# Patient Record
Sex: MALE | Race: WHITE | Employment: OTHER | RURAL
[De-identification: names, ages, dates, MRNs, and addresses within clinical notes are randomized per-mention and may not be internally consistent; named-entity substitution may affect disease eponyms.]

---

## 2022-01-24 ENCOUNTER — PROCEDURE VISIT (OUTPATIENT)
Dept: DERMATOLOGY | Facility: CLINIC | Age: 77
End: 2022-01-24
Payer: OTHER GOVERNMENT

## 2022-01-24 VITALS
SYSTOLIC BLOOD PRESSURE: 126 MMHG | DIASTOLIC BLOOD PRESSURE: 76 MMHG | RESPIRATION RATE: 18 BRPM | HEIGHT: 71 IN | BODY MASS INDEX: 21.84 KG/M2 | WEIGHT: 156 LBS | HEART RATE: 77 BPM

## 2022-01-24 DIAGNOSIS — Z91.89 AT RISK FOR SURGICAL SITE INFECTION: ICD-10-CM

## 2022-01-24 DIAGNOSIS — C44.92 SCC (SQUAMOUS CELL CARCINOMA): Primary | ICD-10-CM

## 2022-01-24 DIAGNOSIS — D48.9 NEOPLASM OF UNCERTAIN BEHAVIOR: ICD-10-CM

## 2022-01-24 PROCEDURE — 17311 MOHS 1 STAGE H/N/HF/G: CPT | Mod: 51,,, | Performed by: STUDENT IN AN ORGANIZED HEALTH CARE EDUCATION/TRAINING PROGRAM

## 2022-01-24 PROCEDURE — 15240 PR FULL THICK GRFT HEAD,FAC,HAND <20SQC: ICD-10-PCS | Mod: ,,, | Performed by: STUDENT IN AN ORGANIZED HEALTH CARE EDUCATION/TRAINING PROGRAM

## 2022-01-24 PROCEDURE — 15240 FTH/GFT F/C/C/M/N/AX/G/H/F20: CPT | Mod: ,,, | Performed by: STUDENT IN AN ORGANIZED HEALTH CARE EDUCATION/TRAINING PROGRAM

## 2022-01-24 PROCEDURE — 11102 PR TANGENTIAL BIOPSY, SKIN, SINGLE LESION: ICD-10-PCS | Mod: XU,,, | Performed by: STUDENT IN AN ORGANIZED HEALTH CARE EDUCATION/TRAINING PROGRAM

## 2022-01-24 PROCEDURE — 99203 OFFICE O/P NEW LOW 30 MIN: CPT | Mod: 25,,, | Performed by: STUDENT IN AN ORGANIZED HEALTH CARE EDUCATION/TRAINING PROGRAM

## 2022-01-24 PROCEDURE — 88305 TISSUE EXAM BY PATHOLOGIST: CPT | Mod: 26,,, | Performed by: PATHOLOGY

## 2022-01-24 PROCEDURE — 99203 PR OFFICE/OUTPT VISIT, NEW, LEVL III, 30-44 MIN: ICD-10-PCS | Mod: 25,,, | Performed by: STUDENT IN AN ORGANIZED HEALTH CARE EDUCATION/TRAINING PROGRAM

## 2022-01-24 PROCEDURE — 11102 TANGNTL BX SKIN SINGLE LES: CPT | Mod: XU,,, | Performed by: STUDENT IN AN ORGANIZED HEALTH CARE EDUCATION/TRAINING PROGRAM

## 2022-01-24 PROCEDURE — 88305 PATHOLOGY, DERMATOLOGY: ICD-10-PCS | Mod: 26,,, | Performed by: PATHOLOGY

## 2022-01-24 PROCEDURE — 88305 TISSUE EXAM BY PATHOLOGIST: CPT | Mod: SUR | Performed by: STUDENT IN AN ORGANIZED HEALTH CARE EDUCATION/TRAINING PROGRAM

## 2022-01-24 PROCEDURE — 17311: ICD-10-PCS | Mod: 51,,, | Performed by: STUDENT IN AN ORGANIZED HEALTH CARE EDUCATION/TRAINING PROGRAM

## 2022-01-24 RX ORDER — CEPHALEXIN 500 MG/1
500 CAPSULE ORAL 3 TIMES DAILY
Qty: 21 CAPSULE | Refills: 0 | Status: SHIPPED | OUTPATIENT
Start: 2022-01-24 | End: 2022-01-31

## 2022-01-24 RX ORDER — ASPIRIN 81 MG/1
81 TABLET ORAL DAILY
COMMUNITY

## 2022-01-24 RX ORDER — HYDROCODONE BITARTRATE AND ACETAMINOPHEN 5; 325 MG/1; MG/1
1 TABLET ORAL EVERY 6 HOURS PRN
Qty: 8 TABLET | Refills: 0 | Status: SHIPPED | OUTPATIENT
Start: 2022-01-24

## 2022-01-24 RX ORDER — CLONAZEPAM 0.5 MG/1
TABLET ORAL
COMMUNITY
Start: 2022-01-06

## 2022-01-24 RX ORDER — CLONIDINE HYDROCHLORIDE 0.1 MG/1
0.2 TABLET ORAL
COMMUNITY

## 2022-01-24 RX ORDER — AMLODIPINE BESYLATE 10 MG/1
1 TABLET ORAL NIGHTLY
COMMUNITY
Start: 2021-07-13

## 2022-01-24 RX ORDER — ATORVASTATIN CALCIUM 20 MG/1
TABLET, FILM COATED ORAL
COMMUNITY
Start: 2021-07-13

## 2022-01-24 NOTE — PROGRESS NOTES
Mohs Surgery Operative Note    Patient name: Howie Adamson  Date: 01/24/2022    Mohs accession #:   Previous dermpath accession #: N/A  Location: left dorsal base of third digit  Preop diagnosis:SCC  Postop diagnosis: Same  Mohs AUC score: 9  Number of stages: 1  Preop size: 1.7 cm x 1.0 cm  Postop size: 2.1 x 1.4 cm   Depth of defect: fat  Repair type: FTSG    Surgeon and Pathologist: ADRY Gore MD     Indications for Mohs Surgery    Removal of the patient's tumor is complicated by the following clinical features: Clinical area critical for tissue conservation (Area H: central face, eyelids, eyebrows, nose, lips, chin, ear, periauricular, temple, genitalia, hands, feet, ankles, nail units and areola) and Poorly-defined clinical tumor borders    Based on my medical judgement, Mohs surgery is the most appropriate treatment for this cancer compared to other treatments. I discussed alternative treatments to Mohs surgery and specifically discussed the risks and benefits of curettage, excision with permanent sections, and foregoing treatment. The rationale for Mohs was explained to the patient and consent was obtained. The risks, benefits and alternatives to therapy were discussed in detail. Specifically, the risks of infection, scarring, bleeding, prolonged wound healing, incomplete removal, allergy to anesthesia, nerve injury and recurrence were addressed. Prior to the procedure, the treatment site was clearly identified and confirmed by the patient. All components of Universal Protocol/PAUSE Rule completed. ASA Gore MD operated in two distinct and integrated capacities as the surgeon and pathologist.    STAGE I:  The patient was placed on the operating table. The cancer was identified and outlined. The entire surgical field was prepped with chlorhexidine The surgical site was anesthetized using Lidocaine 1% with epinephrine 1:100,000 buffered with sodium bicarbonate 8.4% in a 1:10 ratio.    The  area of clinically apparent tumor was debulked with a 2 mm curette. The layer of tissue was then surgically excised using a #15 blade and was then transferred onto a specimen sheet maintaining the orientation of the specimen. Hemostasis was obtained using monopolar electrodesiccation. The wound site was then covered with a dressing while the tissue samples were processed for examination.    The specimen was oriented, mapped and divided. Each section was then inked and processed in the Mohs lab using the Mohs protocol and submitted for frozen section. The histopathologic sections were reviewed by the surgeon in conjunction with the reference map.     Total blocks: 1 Total slides: 3    Frozen section analysis revealed: No additional tumor identified on microscopic examination by the surgeon. Histology: No malignant cells seen in the sections examined.    Additional Histologic Findings: none     REPAIR: Full Thickness Skin Graft    Indication for skin graft:  Suitable location for skin grafting; Avoid high tension repair; Not feasible to close primarily  Primary Surgeon : ADRY Gore   Repair Size: 2.1x 1.4 cm (graft), 3 cm (donor)  Sutures:  4-0 monocryl; 5-0 prolene    The defect was assessed and a donor site on the was identified with similar characteristics to the sacrificed tissue. A marking pen was used to plan the repair. The donor and recipient sites were infiltrated with Lidocaine 1% with epinephrine 1:100,000 buffered with sodium bicarbonate 8.4% in a 1:10 ratio, prepped with chlorhexidine and draped with sterile towels.  The defect (recipient site) was trimmed and debeveled. The donor site was then addressed and incised sharply using a #15 blade to adipose, excised, thinned and trimmed. Hemostasis was obtained using monopolar electrodesiccation. The donor site was undermined widely and approximated with buried vertical mattress sutures placed in the dermis and subcutaneous tissue. The graft was  meticulously secured to the recipient site with prolene sutures. Percutaneous simple running sutures were carefully placed for maximum eversion and meticulous wound edge approximation at the donor site.   The wound was cleansed with saline and ointment was applied along the wound surface. A sterile pressure dressing was applied. Wound care instructions were given verbally and in writing. The patient left the operating suite in stable condition. Patient was informed that additional refinement of the resulting surgical scar may be used as a second stage of this reconstruction.    Timothy Gore MD   Mohs Surgery/Dermatologic Oncology

## 2022-01-24 NOTE — PROGRESS NOTES
"Mohs Surgery Consult Note    Howie Adamson is a 76 y.o. male who is referred by Dr. Treadwell for evaluation of a SCC on the left dorsal base of 3rd digit.     Recurrent skin cancer: {lryesno:41837::"No"}    Preoperative Risk Factors:  Current Anticoagulants: {lryesno:15823::"No"}  Endocarditis / Rheumatic Fever hx: {lryesno:58778::"No"}  Immunocompromised: {lryesno:61135::"No"}  Prosthetic joint: {lryesno:07527::"No"}  Congenital heart defect: {lryesno:75721::"No"}  Prosthetic heart valve: {lryesno:71397::"No"}  Diabetic: {lryesno:18179::"No"}  Transplant: {lryesno:12422::"No"}  Pacemaker: {lryesno:90554::"No"}  Defibrillator:  {lryesno:79336::"No"}  Prior problem with local anesthesia: {lryesno:26107::"No"}  Tobacco History: {lryesno:89998::"No"}]  Clindamycin Allergy: {lryesno:02678::"No"}  Pregnant: no     Transmissible Diseases:  HIV {lryesno:63396::"No"}  Hepatitis B or C  {lryesno:79066::"No"}      Exam:  Limited skin exam is normal except for a ~ *** cm SCC  located on the left dorsal base of 3rd digit  .    Pathologic Findings:  Accession # ***  Diagnosis: SCC    Assessment and Plan:  Treatment Options : The various treatment options for skin cancer removal were reviewed with the patient in detail. These include Mohs surgery with its high cure rate, excisional surgery, destructive treatment, radiation therapy, and various topical therapies.  Given the indications and high cure rate, the patient has agreed to proceed with Mohs.  Risks and Benefits : The rationale for Mohs was explained to the patient. The risks and benefits to therapy were discussed in detail. Specifically, the risks of infection, scarring, bleeding, dehiscence, hematoma, prolonged wound healing, incomplete removal, allergy to anesthesia, nerve injury, inability to clear the tumor, and recurrence were addressed. The treatment site was clearly identified and confirmed by the patient.    Plan:  Mohs Micrographic Surgery    Indication for " Mohs: {lrmohscriteria:68327}  Mohs AUC Score: ***   Proposed closure: ***  Indication for antibiotics: ***    Timothy Gore MD   Mohs Surgery/Dermatologic Oncology

## 2022-01-24 NOTE — PROGRESS NOTES
Mohs Surgery Consult Note    Howie Adamson is a 76 y.o. male who is referred by Dr. Treadwell for evaluation of a SCC on the left dorsal base of the 3rd digit.     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    Pathologic Findings:  Accession # n/a  Diagnosis: SCC    Assessment and Plan:  Treatment Options : The various treatment options for skin cancer removal were reviewed with the patient in detail. These include Mohs surgery with its high cure rate, excisional surgery, destructive treatment, radiation therapy, and various topical therapies.  Given the indications and high cure rate, the patient has agreed to proceed with Mohs.  Risks and Benefits : The rationale for Mohs was explained to the patient. The risks and benefits to therapy were discussed in detail. Specifically, the risks of infection, scarring, bleeding, dehiscence, hematoma, prolonged wound healing, incomplete removal, allergy to anesthesia, nerve injury, inability to clear the tumor, and recurrence were addressed. The treatment site was clearly identified and confirmed by the patient.    Plan:    1) SCC of L hand   Mohs Micrographic Surgery    Indication for Mohs: Clinical area critical for tissue conservation (Area H: central face, eyelids, eyebrows, nose, lips, chin, ear, periauricular, temple, genitalia, hands, feet, ankles, nail units and areola) and Poorly-defined  clinical tumor borders  Mohs AUC Score: 9   Proposed closure: FTSG   Indication for antibiotics: Keflex 500 mg tid x 7 days     I evaluated the pathology report, correlated with clinical findings and patient history, and considered patient risk factors such as current smoking and anticoagulant use. I have corresponded with the referring physician. A major surgery (FTSG) was determined to be necessary.      2) Neoplasm of Uncertain Behavior   - Scaly plaque located on the left dorsal 4th digit  Ddx includes: AK vs SCC    Shave Biopsy     Pre-procedure Diagnosis: neoplasm of uncertain behavior  Post_procedure Diagnosis: same   Estimated Blood Loss: <1cc     Complications: None   Specimens: 1     Written informed consent was obtained after discussing risks including pain, bleeding, infection, recurrence and scarring. The biopsy site was sterilely prepped with alcohol, which was allowed to dry completely, then locally infiltrated with 1% lidocaine with epinephrine, 3 cc total. A shave biopsy was obtained using a Dermablade/15 blade and the specimen was sent to dermatopathology.  Aluminum chloride was used for hemostasis. Petrolatum ointment and a clean dressing were applied. The patient tolerated the procedure well without complications. Verbal and written wound care instructions were given.     .  Timothy Gore MD   Mohs Surgery/Dermatologic Oncology

## 2022-01-24 NOTE — PROGRESS NOTES
"Mohs Surgery Consult Note    Howie Adamson is a 76 y.o. male who is referred by  *** for evaluation of a *** on the ***.     Recurrent skin cancer: {lryesno:68755::"No"}    Preoperative Risk Factors:  Current Anticoagulants: {lryesno:35668::"No"}  Endocarditis / Rheumatic Fever hx: {lryesno:55763::"No"}  Immunocompromised: {lryesno:50231::"No"}  Prosthetic joint: {lryesno:98130::"No"}  Congenital heart defect: {lryesno:81889::"No"}  Prosthetic heart valve: {lryesno:86809::"No"}  Diabetic: {lryesno:52859::"No"}  Transplant: {lryesno:87841::"No"}  Pacemaker: {lryesno:47825::"No"}  Defibrillator:  {lryesno:29054::"No"}  Prior problem with local anesthesia: {lryesno:70639::"No"}  Tobacco History: {lryesno:88412::"No"}]  Clindamycin Allergy: {lryesno:53269::"No"}  Pregnant: no     Transmissible Diseases:  HIV {lryesno:42896::"No"}  Hepatitis B or C  {lryesno:60322::"No"}      Exam:  Limited skin exam is normal except for a ~ *** cm ***  located on the ***  .    Pathologic Findings:  Accession # ***  Diagnosis: ***    Assessment and Plan:  Treatment Options : The various treatment options for skin cancer removal were reviewed with the patient in detail. These include Mohs surgery with its high cure rate, excisional surgery, destructive treatment, radiation therapy, and various topical therapies.  Given the indications and high cure rate, the patient has agreed to proceed with Mohs.  Risks and Benefits : The rationale for Mohs was explained to the patient. The risks and benefits to therapy were discussed in detail. Specifically, the risks of infection, scarring, bleeding, dehiscence, hematoma, prolonged wound healing, incomplete removal, allergy to anesthesia, nerve injury, inability to clear the tumor, and recurrence were addressed. The treatment site was clearly identified and confirmed by the patient.    Plan:  Mohs Micrographic Surgery    Indication for Mohs: {lrmohscriteria:84947}  Mohs AUC Score: *** "   Proposed closure: ***  Indication for antibiotics: ***    Timothy Gore MD   Mohs Surgery/Dermatologic Oncology

## 2022-01-24 NOTE — PATIENT INSTRUCTIONS
WOUND CARE INSTRUCTIONS    1. Leave your pressure bandage on for 48 hours. You will not need to perform any wound care until this bandage is removed. Please do NOT get the bandage wet.  2. When you initially begin wound care, you may let the water hit the pressure bandage to loosen it from your skin. The bandage should be removed before bathing/showering.  3. Wash your hands thoroughly before starting wound care. Do not use the same cloth/rag/sponge you would use to wash the remainder of your body as this may introduce bacteria from other areas of your body and possibly cause infection at the surgical site.  4. You will clean the surgery site once to twice daily with a mild liquid soap (i.e. Dove, Cetaphil, Baby shampoo). Do not use anything antibacterial, as this will dry the surgical site.   5. Dry the area with a fresh Q-tip or clean gauze.  6. Apply a generous amount of Vaseline or Aquaphor to the wound/sutures. Do not use Neosporin, or any antibacterial ointment as this is likely to cause an allergic reaction to the site. If you are not sure of the sanitary condition of any Vaseline/Aquaphor you may have at home, please purchase a new jar or tube. DO NOT DOUBLE-DIP Q-tips into the ointment and DO NOT USE YOUR FINGERS. This is essential in helping to prevent cross contamination and infection.   7. Cut a non-stick bandage pad to fit the area and then use bandaging tape to hold in place. Paper tape is a good option for very sensitive skin types.  8. You will be using mild soap, clean tap water, Vaseline/Aquaphor, and a bandage twice a day for 1 week  9. After surgery, you may restart all your medications that were stopped (if applicable).      If your surgical site is on your forehead, or close to the eye area, you will want to use ice packs. Please apply ice packs every hour for 20 minutes while awake. Sleep elevated for the next two nights as this will help decrease the amount of bruising and swelling you will  notice the evening after surgery and into the next morning.   For surgical areas on your arms/legs, try to keep the area elevated above the level of your heart as much as possible. This will help to decrease swelling. Frequent gentle rubbing of your fingers or toes in that area will prevent numbness and stiffness.   If located on your arm/hand, we ask that you do not lift anything heavier than a gallon of milk for two weeks. Keep the arm/hand elevated to help decrease swelling in the wrist and fingers. Do not wear jewelry as impending swelling could cause discomfort.  For surgical areas on your head/neck, do not bend over or stoop down. Do not drop your head, as this increases blood to the surgical area and can induce bleeding. Refrain from use of hair care products, hair coloring, or permanents until sutures have been removed and/or the surgical site has completely healed.    BATHING: Begin bathing/showering once pressure bandage comes off. Do not let direct water pressure hit the surgery site. It is okay if it gets wet, just let the water roll over.    PAIN: Tylenol (Acetaminophen) or NSAIDs such as ibuprofen (Advil) or naproxen (Aleve) are adequate for pain relief in most cases, if you are able to take those medications. Alternating Tylenol (acetaminophen) and NSAIDs at 3 hour intervals works well as these medications work differently. For instance, take 1 g of Tylenol at hour 0, then 400-600 mg of Advil at hour 3 if needed, then 1 g of Tylenol at hour 6 if needed, then 400-600 mg of Advil at hour 9 if needed. Do not exceed the daily limit for either medication, which can be found on the bottle. We try to avoid narcotic medications as much as possible. If you are still having severe pain not managed by the above methods, please call our clinic.    SIGNS OF POSSIBLE INFECTION: Significant redness surrounding the surgery site that is warm to the touch, persistent or worsening pain, fever or flu-like symptoms,  increased swelling to the area, thick yellow discharge, and/or foul odor. Please call our office as soon as possible if you experience any of these symptoms as you may have an infection.     BLEEDING: A mild amount of blood on the bandage is expected. Soaking through the bandage is not normal. If this occurs, remove the soiled bandage and apply uninterrupted pressure for 20 minutes by the clock. If this does not stop the bleeding, hold pressure for another 20 minutes with an ice pack. If bleeding stops, apply a bandage per wound care instructions.     IF THE BLEEDING PERSISTS, PLEASE CALL OUR OFFICE.     Normal office hours:   After hours:     If you have concerns about how your wound is healing and would like to send us a photo, please send us a [employer] message, or call for instructions on how to securely send an email.

## 2022-01-24 NOTE — PROGRESS NOTES
"Mohs Surgery Consult Note    Howie Adamson is a 76 y.o. male who is referred by  *** for evaluation of a *** on the ***.     Recurrent skin cancer: {lryesno:89624::"No"}    Preoperative Risk Factors:  Current Anticoagulants: {lryesno:27716::"No"}  Endocarditis / Rheumatic Fever hx: {lryesno:98276::"No"}  Immunocompromised: {lryesno:87474::"No"}  Prosthetic joint: {lryesno:66552::"No"}  Congenital heart defect: {lryesno:43736::"No"}  Prosthetic heart valve: {lryesno:08967::"No"}  Diabetic: {lryesno:17515::"No"}  Transplant: {lryesno:82104::"No"}  Pacemaker: {lryesno:48442::"No"}  Defibrillator:  {lryesno:38877::"No"}  Prior problem with local anesthesia: {lryesno:51981::"No"}  Tobacco History: {lryesno:69505::"No"}]  Clindamycin Allergy: {lryesno:59670::"No"}  Pregnant: no     Transmissible Diseases:  HIV {lryesno:60383::"No"}  Hepatitis B or C  {lryesno:73150::"No"}      Exam:  Limited skin exam is normal except for a ~ *** cm ***  located on the ***  .    Pathologic Findings:  Accession # ***  Diagnosis: ***    Assessment and Plan:  Treatment Options : The various treatment options for skin cancer removal were reviewed with the patient in detail. These include Mohs surgery with its high cure rate, excisional surgery, destructive treatment, radiation therapy, and various topical therapies.  Given the indications and high cure rate, the patient has agreed to proceed with Mohs.  Risks and Benefits : The rationale for Mohs was explained to the patient. The risks and benefits to therapy were discussed in detail. Specifically, the risks of infection, scarring, bleeding, dehiscence, hematoma, prolonged wound healing, incomplete removal, allergy to anesthesia, nerve injury, inability to clear the tumor, and recurrence were addressed. The treatment site was clearly identified and confirmed by the patient.    Plan:  Mohs Micrographic Surgery    Indication for Mohs: {lrmohscriteria:87995}  Mohs AUC Score: *** "   Proposed closure: ***  Indication for antibiotics: ***    Timothy Gore MD   Mohs Surgery/Dermatologic Oncology

## 2022-01-26 LAB
ESTROGEN SERPL-MCNC: NORMAL PG/ML
LAB AP GROSS DESCRIPTION: NORMAL
LAB AP LABORATORY NOTES: NORMAL
LAB AP SPEC A DDX: NORMAL
LAB AP SPEC A MORPHOLOGY: NORMAL
LAB AP SPEC A PROCEDURE: NORMAL
T3RU NFR SERPL: NORMAL %

## 2022-01-31 ENCOUNTER — CLINICAL SUPPORT (OUTPATIENT)
Dept: DERMATOLOGY | Facility: CLINIC | Age: 77
End: 2022-01-31
Payer: MEDICARE

## 2022-01-31 DIAGNOSIS — Z48.89 ENCOUNTER FOR POST SURGICAL WOUND CHECK: Primary | ICD-10-CM

## 2022-02-14 ENCOUNTER — CLINICAL SUPPORT (OUTPATIENT)
Dept: DERMATOLOGY | Facility: CLINIC | Age: 77
End: 2022-02-14
Payer: MEDICARE

## 2022-02-14 DIAGNOSIS — L08.9 SKIN INFECTION: ICD-10-CM

## 2022-02-14 DIAGNOSIS — Z48.89 ENCOUNTER FOR POST SURGICAL WOUND CHECK: Primary | ICD-10-CM

## 2022-02-14 PROCEDURE — 87070 CULTURE, WOUND: ICD-10-PCS | Mod: ,,, | Performed by: CLINICAL MEDICAL LABORATORY

## 2022-02-14 PROCEDURE — 87070 CULTURE OTHR SPECIMN AEROBIC: CPT | Mod: ,,, | Performed by: CLINICAL MEDICAL LABORATORY

## 2022-02-14 NOTE — PROGRESS NOTES
Patient name: Howie Adamson  Date: 01/24/2022     Mohs accession #:   Previous dermpath accession #: N/A  Location: left dorsal base of third digit  Preop diagnosis:SCC  Postop diagnosis: Same  Mohs AUC score: 9  Number of stages: 1  Preop size: 1.7 cm x 1.0 cm  Postop size: 2.1 x 1.4 cm   Depth of defect: fat  Repair type: FTSG        Patient's hand red and swollen. Culture obtained to rule out any infection. Patient will return in 2 weeks for follow up wound check.    Kim Ramos RN

## 2022-02-16 LAB — MICROORGANISM SPEC CULT: NORMAL

## 2022-03-03 ENCOUNTER — CLINICAL SUPPORT (OUTPATIENT)
Dept: DERMATOLOGY | Facility: CLINIC | Age: 77
End: 2022-03-03
Payer: MEDICARE

## 2022-03-03 DIAGNOSIS — Z51.89 VISIT FOR WOUND CHECK: Primary | ICD-10-CM

## 2022-03-03 NOTE — PROGRESS NOTES
Patient name: Howie Adamson  Date: 01/24/2022     Mohs accession #:   Previous dermpath accession #: N/A  Location: left dorsal base of third digit  Preop diagnosis:SCC  Postop diagnosis: Same  Mohs AUC score: 9  Number of stages: 1  Preop size: 1.7 cm x 1.0 cm  Postop size: 2.1 x 1.4 cm   Depth of defect: fat  Repair type: FTSG    2 week wound check no swelling noted patient states it is  when moving but overall his hand does feel better.      Gemma'On Wesley/ IVC

## 2022-03-28 NOTE — PROGRESS NOTES
Mohs Surgery Consult Note    Howie Adamson is a 76 y.o. male who is referred by Dr. Gore for evaluation of a SCC on the L hand.     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    Pathologic Findings:  Accession # [id number]  Diagnosis: SCC    Assessment and Plan:  Treatment Options : The various treatment options for skin cancer removal were reviewed with the patient in detail. These include Mohs surgery with its high cure rate, excisional surgery, destructive treatment, radiation therapy, and various topical therapies.  Given the indications and high cure rate, the patient has agreed to proceed with Mohs.  Risks and Benefits : The rationale for Mohs was explained to the patient. The risks and benefits to therapy were discussed in detail. Specifically, the risks of infection, scarring, bleeding, dehiscence, hematoma, prolonged wound healing, incomplete removal, allergy to anesthesia, nerve injury, inability to clear the tumor, and recurrence were addressed. The treatment site was clearly identified and confirmed by the patient.    Plan:  Mohs Micrographic Surgery    Indication for Mohs: Clinical area critical for tissue conservation (Area H: central face, eyelids, eyebrows, nose, lips, chin, ear, periauricular, temple, genitalia, hands, feet, ankles, nail units and areola) and Poorly-defined clinical tumor borders  Mohs AUC Score: 9   Proposed closure: Rhombic    Indication for antibiotics: Gentamicin ointment daily x 7 days       Timothy Gore MD   Mohs Surgery/Dermatologic Oncology

## 2022-03-28 NOTE — PATIENT INSTRUCTIONS

## 2022-03-30 ENCOUNTER — PROCEDURE VISIT (OUTPATIENT)
Dept: DERMATOLOGY | Facility: CLINIC | Age: 77
End: 2022-03-30
Payer: MEDICARE

## 2022-03-30 VITALS
BODY MASS INDEX: 21.84 KG/M2 | WEIGHT: 156 LBS | DIASTOLIC BLOOD PRESSURE: 74 MMHG | RESPIRATION RATE: 19 BRPM | HEIGHT: 71 IN | SYSTOLIC BLOOD PRESSURE: 134 MMHG | HEART RATE: 73 BPM

## 2022-03-30 DIAGNOSIS — Z91.89 AT RISK FOR SURGICAL SITE INFECTION: ICD-10-CM

## 2022-03-30 DIAGNOSIS — C44.629 SCC (SQUAMOUS CELL CARCINOMA), HAND, LEFT: Primary | ICD-10-CM

## 2022-03-30 PROCEDURE — 17312 MOHS ADDL STAGE: CPT | Mod: 79,,, | Performed by: STUDENT IN AN ORGANIZED HEALTH CARE EDUCATION/TRAINING PROGRAM

## 2022-03-30 PROCEDURE — 14040 TIS TRNFR F/C/C/M/N/A/G/H/F: CPT | Mod: 79,,, | Performed by: STUDENT IN AN ORGANIZED HEALTH CARE EDUCATION/TRAINING PROGRAM

## 2022-03-30 PROCEDURE — 17311: ICD-10-PCS | Mod: 79,51,, | Performed by: STUDENT IN AN ORGANIZED HEALTH CARE EDUCATION/TRAINING PROGRAM

## 2022-03-30 PROCEDURE — 17311 MOHS 1 STAGE H/N/HF/G: CPT | Mod: 79,51,, | Performed by: STUDENT IN AN ORGANIZED HEALTH CARE EDUCATION/TRAINING PROGRAM

## 2022-03-30 PROCEDURE — 17312: ICD-10-PCS | Mod: 79,,, | Performed by: STUDENT IN AN ORGANIZED HEALTH CARE EDUCATION/TRAINING PROGRAM

## 2022-03-30 PROCEDURE — 14040 PR ADJ TISS XFER HEAD,FAC,HAND <10 SQCM: ICD-10-PCS | Mod: 79,,, | Performed by: STUDENT IN AN ORGANIZED HEALTH CARE EDUCATION/TRAINING PROGRAM

## 2022-03-30 PROCEDURE — 99499 UNLISTED E&M SERVICE: CPT | Mod: ,,, | Performed by: STUDENT IN AN ORGANIZED HEALTH CARE EDUCATION/TRAINING PROGRAM

## 2022-03-30 PROCEDURE — 99499 NO LOS: ICD-10-PCS | Mod: ,,, | Performed by: STUDENT IN AN ORGANIZED HEALTH CARE EDUCATION/TRAINING PROGRAM

## 2022-03-30 RX ORDER — GENTAMICIN SULFATE 1 MG/G
OINTMENT TOPICAL 3 TIMES DAILY
Qty: 30 G | Refills: 5 | Status: SHIPPED | OUTPATIENT
Start: 2022-03-30 | End: 2022-04-13

## 2022-03-30 NOTE — PROGRESS NOTES
Mohs Surgery Operative Note    Patient name: Howie Adamson  Date: 03/30/2022    Mohs accession #: [id number]  Previous dermpath accession #: [id number]  Location: left hand  Preop diagnosis:SCC  Postop diagnosis: Same  Mohs AUC score: 9  Number of stages: 2  Preop size: 0.8 x 1.0 cm  Postop size: 1.5 x 1.3 cm  Depth of defect: fat  Repair type: Rhombic     Surgeon and Pathologist: ADRY Gore MD     Indications for Mohs Surgery    Removal of the patient's tumor is complicated by the following clinical features: Clinical area critical for tissue conservation (Area H: central face, eyelids, eyebrows, nose, lips, chin, ear, periauricular, temple, genitalia, hands, feet, ankles, nail units and areola) and Poorly-defined clinical tumor borders    Based on my medical judgement, Mohs surgery is the most appropriate treatment for this cancer compared to other treatments. I discussed alternative treatments to Mohs surgery and specifically discussed the risks and benefits of curettage, excision with permanent sections, and foregoing treatment. The rationale for Mohs was explained to the patient and consent was obtained. The risks, benefits and alternatives to therapy were discussed in detail. Specifically, the risks of infection, scarring, bleeding, prolonged wound healing, incomplete removal, allergy to anesthesia, nerve injury and recurrence were addressed. Prior to the procedure, the treatment site was clearly identified and confirmed by the patient. All components of Universal Protocol/PAUSE Rule completed. ASA Gore MD operated in two distinct and integrated capacities as the surgeon and pathologist.    STAGE I:  The patient was placed on the operating table. The cancer was identified and outlined. The entire surgical field was prepped with chlorhexidine The surgical site was anesthetized using Lidocaine 1% with epinephrine 1:100,000 buffered with sodium bicarbonate 8.4% in a 1:10 ratio.    The area of  clinically apparent tumor was debulked with a 2 mm curette. The layer of tissue was then surgically excised using a #15 blade and was then transferred onto a specimen sheet maintaining the orientation of the specimen. Hemostasis was obtained using monopolar electrodesiccation. The wound site was then covered with a dressing while the tissue samples were processed for examination.    The specimen was oriented, mapped and divided. Each section was then inked and processed in the Mohs lab using the Mohs protocol and submitted for frozen section. The histopathologic sections were reviewed by the surgeon in conjunction with the reference map.     Total blocks: 1 Total slides: 2    Frozen section analysis revealed: residual tumor present on stage 1. Tumor was indicated in red on the reference map.    Cell morphology: Atypical squamous cell nests infiltrating into the dermis  Pathological Pattern: Squamous cell carcinoma   Depth of invasion: Dermis   Presence of scar tissue: No  Perineural invasion: No  Actinic keratosis: Yes  Inflammation obscuring possible tumor presence: No    STAGE II:  The patient was prepped in the same fashion as the first stage. Using a similar technique to that described above, a thin layer of tissue was removed from all areas where tumor was visible on the previous stage. The tissue was again oriented, mapped, dyed, and processed as above. Histopathologic sections were reviewed in conjunction with the reference map.     Total blocks: 1 Total slides: 1    Frozen section analysis revealed: No additional tumor identified on microscopic examination by the surgeon. Histology: No malignant cells seen in the sections examined.    Additional Histologic Findings: None      REPAIR: Rhombic Transposition Flap    Primary Surgeon : ADRY Gore MD  Indication for flap: A flap was chosen because closing the wound by second intention, primary linear closure, or skin grafting would result in a functionally  unsatisfactory result. Additionally, a flap was chosen to recruit additional tissue, displace tension away from the defect and any nearby free margins, as well as to reorient tension vectors in more favorable directions    Repair Size: 3 x 2 cm  Sutures:  4-0 monocryl; 5-0 prolene     The defect was identified and a marking pen was used to plan the repair. The area was infiltrated with Lidocaine 1% with epinephrine 1:100,000 buffered with sodium bicarbonate 8.4% in a 1:10 ratio, prepped with chlorhexidine and draped with sterile towels.  The flap was incised using a #15 blade to adipose and undermined widely. The defect was debeveled and undermined. Hemostasis was obtained using monopolar electrodesiccation. The flap was transposed and secured with buried vertical mattress sutures placed in the dermis and subcutaneous tissue. A standing cone was removed opposite the flap to minimize tissue deformity. Percutaneous simple running sutures were carefully placed for maximum eversion and meticulous wound edge approximation. Careful attention was paid to avoid distorting any nearby free margins.    The wound was cleansed with saline and ointment was applied along the wound surface. A sterile pressure dressing was applied. Wound care instructions were given verbally and in writing. The patient left the operating suite in stable condition. Patient was informed that additional refinement of the resulting surgical scar may be used as a second stage of this reconstruction.    Timothy Gore MD   Mohs Surgery/Dermatologic Oncology

## 2022-04-11 ENCOUNTER — CLINICAL SUPPORT (OUTPATIENT)
Dept: DERMATOLOGY | Facility: CLINIC | Age: 77
End: 2022-04-11
Payer: MEDICARE

## 2022-04-11 DIAGNOSIS — Z48.89 ENCOUNTER FOR POST SURGICAL WOUND CHECK: Primary | ICD-10-CM

## 2022-04-11 NOTE — PROGRESS NOTES
Patient name: Howie Adamson  Date: 03/30/2022     Mohs accession #: [id number]  Previous dermpath accession #: [id number]  Location: left hand  Preop diagnosis:SCC  Postop diagnosis: Same  Mohs AUC score: 9  Number of stages: 2  Preop size: 0.8 x 1.0 cm  Postop size: 1.5 x 1.3 cm  Depth of defect: fat  Repair type: Rhombic         Sutures removed. Patient tolerated well. Incision healing well. Patient will return in 6 weeks for wound check.    Kim Ramos RN

## 2024-09-13 NOTE — PROGRESS NOTES
Pt is here for 1wk post mohs SR, Mohs on 1/24/2021  Graph site slightly swelled but healing very well. Erythema surrounding where the graph was placed.  Pt says a little tender, but no real pain  RTC in next available mohs slot for SCC adjacent to present wound  Isabel Shipley,CMA               Warm